# Patient Record
Sex: FEMALE | Race: WHITE | Employment: FULL TIME
[De-identification: names, ages, dates, MRNs, and addresses within clinical notes are randomized per-mention and may not be internally consistent; named-entity substitution may affect disease eponyms.]

---

## 2023-02-17 ENCOUNTER — HOSPITAL ENCOUNTER (OUTPATIENT)
Facility: HOSPITAL | Age: 43
Setting detail: RECURRING SERIES
Discharge: HOME | End: 2023-02-20
Payer: COMMERCIAL

## 2023-02-17 PROCEDURE — 97535 SELF CARE MNGMENT TRAINING: CPT

## 2023-02-17 PROCEDURE — 97110 THERAPEUTIC EXERCISES: CPT

## 2023-02-17 PROCEDURE — 97161 PT EVAL LOW COMPLEX 20 MIN: CPT

## 2023-02-17 NOTE — THERAPY EVALUATION
201 [facility] PHYSICAL THERAPY  [address] [facility], [address], [place], [address] [place] [address] Ph: [phone] Fx: [phone]  Plan of Care / Statement of Necessity for Physical Therapy Services     Patient Name: Savanna Stone : 1980   Medical   Diagnosis: Right shoulder pain [M25.511]  Treatment Diagnosis: Right shoulder pain [M25.511]   Onset Date: DOS 2/15/23     Referral Source: Louise Rasmussen MD [facility]): 2023   Prior Hospitalization: See medical history Provider #: [id number]   Prior Level of Function: Limited use of R shoulder due to pain   Comorbidities: Prior surgery: L shoulder biceps tenodesis 4 years ago (doing great)    Medications: Verified on Patient Summary List     Assessment / key information:  Savanna Stone is a 37 y.o. female who presents to skilled PT for the treatment diagnosis of R shoulder pain secondary to being 2 days s/p R shoulder arthroscopic biceps tenodesis, debridement and SAD. Pt is sleeping okay. Pt has been taking oxycodone intermittently. Icing a couple times. Pt has good transportation system. Pt is R handed. Overall notes that she is doing better for this shoulder than her L shoulder surgery. Pt goals is being able to walk her dogs, gardening,     Pain:   Current: 5/10     Worst: 5/10    Best: 1/10      Objective:    Posture: R forward shoulder in sling    Observation: dressing removed, 4 portals with steri strips, no signs of infection. Pt able to perform wrist AROM with ease  Pt able to perform elbow AAROM without pain full extension and full flexion     Functional Activity:  NT    Shoulder AROM   NT    Shoulder PROM   NT    Shoulder MMT   NT    Today was spent mostly on education for protocol, rehab progression, symptoms management, and providing HEP.  Pt would benefit form skilled PT services addressing the current ROM, strength, functional performance, and balance impairments so that the patient to return to performing all ADLs with minimal restriction/limitation or without any functional limitations.     HEP provided to the patient in order to promote improvement and self management of symptoms and functional performance     Evaluation Complexity:  History:  LOW Complexity : Zero comorbidities / personal factors that will impact the outcome / POC; Examination:  LOW Complexity : 1-2 Standardized tests and measures addressing body structure, function, activity limitation and / or participation in recreation  ;Presentation:  LOW Complexity : Stable, uncomplicated  ;Clinical Decision Making:  MEDIUM Complexity : FOTO score of 26-74 FOTO score = an established functional score where 100 = no disability  Overall Complexity Rating: LOW   Problem List: pain affecting function, decrease ROM, decrease strength, edema affection function, decrease ADL/functional abilities, decrease activity tolerance, decrease flexibility/joint mobility, and decrease transfer abilities    Treatment Plan may include any combination of the followin Therapeutic Exercise, 44889 Neuromuscular Re-Education, 17995 Manual Therapy, 80345 Therapeutic Activity, 35491 Self Care/Home Management, 22432 Electrical Stim unattended, 79272 Electrical Stim attended, and 39670 Vasopneumatic Device  Patient / Family readiness to learn indicated by: asking questions, trying to perform skills, interest, return verbalization , and return demonstration   Persons(s) to be included in education: patient (P)  Barriers to Learning/Limitations: none  Measures taken if barriers to learning present: NA  Patient Goal (s): \"regain function (walking dogs and gardening)  Patient Self Reported Health Status: excellent  Rehabilitation Potential: excellent    Short Term Goals: To be accomplished in 6 weeks  1) Pt will be IND with HEP to facilitate self care management.   2) Pt will improve R shoulder PROM to flexion/ABD >90 deg, Er >45 deg, IR >45 deg to order to improve ability to  perform ADLs below shoulder height within protocol limits     3) Pt will improve worst pain levels from initial evaluation level of 5/10 to 3/10 to show improved QOL and improved overall perception of pain-free/more pain-free function with ADLs. Long Term Goals: To be accomplished in 12 weeks  1) Pt will maintain an average pain of 3/10 or less with all activities showing a progression in overall pain levels despite increases in activity. 2) Pt will improve FOTO scores to 72 in order to show detectable change in overall function. 3) Pt will improve shoulder MMT to >/= 4+/5 in order to be able to perform all ADLs with adequate ability and strength    4) Pt will improve R shoulder AROM to flexion/ABD >160 deg, KAREN >C7 and FIR >L2 to order to improve ability to perform all ADLs without restriction. Frequency / Duration: Patient to be seen 2 times per week for 12 weeks    Patient/ Caregiver education and instruction: Diagnosis, prognosis, self care, activity modification, brace/ splint application, and exercises [x]  Plan of care has been reviewed with PTA    Certification Period: KARELY Marvin, PT       2/17/2023       7:57 AM  ===================================================================  I certify that the above Therapy Services are being furnished while the patient is under my care. I agree with the treatment plan and certify that this therapy is necessary. Physician's Signature:_________________________   DATE:_________   TIME:________                           Jana Dominique MD    ** Signature, Date and Time must be completed for valid certification **  Please sign and fax to [facility] [phone].   Thank you

## 2023-02-21 ENCOUNTER — HOSPITAL ENCOUNTER (OUTPATIENT)
Facility: HOSPITAL | Age: 43
Setting detail: RECURRING SERIES
Discharge: HOME | End: 2023-02-24
Payer: COMMERCIAL

## 2023-02-21 PROCEDURE — 97110 THERAPEUTIC EXERCISES: CPT

## 2023-02-21 PROCEDURE — 97140 MANUAL THERAPY 1/> REGIONS: CPT

## 2023-02-21 NOTE — PROGRESS NOTES
PHYSICAL / OCCUPATIONAL THERAPY - DAILY TREATMENT NOTE (updated )    Patient Name: Sajan Mirza    Date: 2023    : 1980  Insurance: Payor: Mary Barakat / Plan: Mary Barakat / Product Type: *No Product type* /      Patient  verified Yes     Visit #   Current / Total 2 12   Time   In / Out 1135 1230   Pain   In / Out 2/10 1/10   Subjective Functional Status/Changes: The exercises are going well. So far no setback. Changes to:  Meds, Allergies, Med Hx, Sx Hx? If yes, update Summary List no       TREATMENT AREA =  Right shoulder pain [M25.511]    OBJECTIVE    Modalities Rationale:     decrease edema, decrease inflammation, and decrease pain to improve patient's ability to progress to PLOF and address remaining functional goals. min [] Estim Unattended, type/location:                                      []  w/ice    []  w/heat    min [] Estim Attended, type/location:                                     []  w/US     []  w/ice    []  w/heat    []  TENS insruct      min []  Mechanical Traction: type/lbs                   []  pro   []  sup   []  int   []  cont    []  before manual    []  after manual    min []  Ultrasound, settings/location:      min []  Iontophoresis w/ dexamethasone, location:                                               []  take home patch       []  in clinic   15 min  unbill [x]  Ice     []  Heat    location/position: Seated R shoulder    min []  Paraffin,  details:     min []  Vasopneumatic Device, press/temp:     min []  Catrina Chaparro / Zina Mailman: If using vaso (only need to measure limb vaso being performed on)      pre-treatment girth :       post-treatment girth :       measured at (landmark location) :      min []  Other:    Skin assessment post-treatment (if applicable):    [x]  intact    [x]  redness- no adverse reaction                 []redness - adverse reaction:         Therapeutic Procedures:   Tx Min Billable or 1:1 Min (if diff from [employer]) Procedure, Rationale, Specifics   25 [id number] Therapeutic Exercise (timed):  increase ROM, strength, coordination, balance, and proprioception to improve patient's ability to progress to PLOF and address remaining functional goals. (see flow sheet as applicable)     Details if applicable:  R shoulder PROM per protocol      15  70552 Manual Therapy (timed):  decrease pain, increase ROM, and increase tissue extensibility to improve patient's ability to progress to PLOF and address remaining functional goals. The manual therapy interventions were performed at a separate and distinct time from the therapeutic activities interventions . (see flow sheet as applicable)     Details if applicable:  gentle oscillation of the shoulder for relaxation  GHJ mobs AP/PA/onf gr 1-2    Gentle STM along shoulder          Details if applicable:            Details if applicable:            Details if applicable:     36  MC BC Totals Reminder: bill using total billable min of TIMED therapeutic procedures (example: do not include dry needle or estim unattended, both untimed codes, in totals to left)  8-22 min = 1 unit; 23-37 min = 2 units; 38-52 min = 3 units; 53-67 min = 4 units; 68-82 min = 5 units   Total Total     [x]  Patient Education billed concurrently with other procedures   [x] Review HEP    [x] Progressed/Changed HEP, detail: Access Code: [email or web address]  URL: [email or web address]  Date: 02/21/2023  Prepared by: Damian Bassett    Exercises  Flexion-Extension Shoulder Pendulum with Table Support - 1-4 x daily - 7 x weekly - 20\" hold  Circular Shoulder Pendulum with Table Support - 1-4 x daily - 7 x weekly - 20\" hold  Elbow Flexion PROM - 1-4 x daily - 7 x weekly - 10 reps  Wrist Circumduction AROM - 1-4 x daily - 7 x weekly - 10 reps  Forearm Strengthening with Ball Squeeze - 1-8 x daily - 7 x weekly - 10 reps - 3\" hold  Ice - 1-4 x daily - 7 x weekly - 10-15' hold  Standing 'L' Stretch at Counter - 1-2 x daily - 7 x weekly - 10 reps - 5\" hold  Seated Scapular Retraction - 1-4 x daily - 7 x weekly - 10 reps - 3\" hold    [] Other detail:       Objective Information/Functional Measures/Assessment    No active supination of elbow flexion 6 wks  Progress to AAROM, AROM at 6 wks   Sling 6 weeks     Patient tolerated today's treatment very well. Showed good PROM today: flexion about 100 de, ABD 90 deg, IR 45 deg, ER 30 deg. No pain just tension with most of these. Added scap squeezes and shoulder walkaway flexion stretch today to HEP    Patient will continue to benefit from skilled PT / OT services to modify and progress therapeutic interventions, analyze and address functional mobility deficits, analyze and address ROM deficits, analyze and address strength deficits, analyze and address soft tissue restrictions, analyze and cue for proper movement patterns, analyze and modify for postural abnormalities, and instruct in home and community integration to address functional deficits and attain remaining goals. Progress toward goals / Updated goals:  []  See Progress Note/Recertification    Short Term Goals: To be accomplished in 6 weeks  1) Pt will be IND with HEP to facilitate self care management. 2) Pt will improve R shoulder PROM to flexion/ABD >90 deg, Er >45 deg, IR >45 deg to order to improve ability to perform ADLs below shoulder height within protocol limits     3) Pt will improve worst pain levels from initial evaluation level of 5/10 to 3/10 to show improved QOL and improved overall perception of pain-free/more pain-free function with ADLs. Long Term Goals: To be accomplished in 12 weeks  1) Pt will maintain an average pain of 3/10 or less with all activities showing a progression in overall pain levels despite increases in activity. 2) Pt will improve FOTO scores to 72 in order to show detectable change in overall function.     3) Pt will improve shoulder MMT to >/= 4+/5 in order to be able to perform all ADLs with adequate ability and strength    4) Pt will improve R shoulder AROM to flexion/ABD >160 deg, KAREN >C7 and FIR >L2 to order to improve ability to perform all ADLs without restriction. All goals remain applicable at this time.      PLAN  Yes  Continue plan of care  [x]  Upgrade activities as tolerated  []  Discharge due to :  []  Other:    Brendan Huerta PT    2/21/2023    8:43 AM    Future Appointments   Date Time Provider Katelyn Perera   2/21/2023 11:30 AM Brendan Huerta PT [facility]   2/23/2023 11:30 AM Brendan Huerta PT [facility]

## 2023-02-23 ENCOUNTER — HOSPITAL ENCOUNTER (OUTPATIENT)
Facility: HOSPITAL | Age: 43
Setting detail: RECURRING SERIES
Discharge: HOME | End: 2023-02-26
Payer: COMMERCIAL

## 2023-02-23 PROCEDURE — 97140 MANUAL THERAPY 1/> REGIONS: CPT

## 2023-02-23 PROCEDURE — 97110 THERAPEUTIC EXERCISES: CPT

## 2023-02-23 NOTE — PROGRESS NOTES
PHYSICAL / OCCUPATIONAL THERAPY - DAILY TREATMENT NOTE (updated )    Patient Name: Juliette Rao    Date: 2023    : 1980  Insurance: Payor: Sara Grimaldo / Plan: Sara Grimaldo / Product Type: *No Product type* /      Patient  verified Yes     Visit #   Current / Total 3 12   Time   In / Out 1135 1220   Pain   In / Out 2/10 1/10   Subjective Functional Status/Changes: Tried to get a robe on and was more sore. Better today. Changes to:  Meds, Allergies, Med Hx, Sx Hx? If yes, update Summary List no       TREATMENT AREA =  Right shoulder pain [M25.511]    OBJECTIVE    Modalities Rationale:     decrease edema, decrease inflammation, and decrease pain to improve patient's ability to progress to PLOF and address remaining functional goals. min [] Estim Unattended, type/location:                                      []  w/ice    []  w/heat    min [] Estim Attended, type/location:                                     []  w/US     []  w/ice    []  w/heat    []  TENS insruct      min []  Mechanical Traction: type/lbs                   []  pro   []  sup   []  int   []  cont    []  before manual    []  after manual    min []  Ultrasound, settings/location:      min []  Iontophoresis w/ dexamethasone, location:                                               []  take home patch       []  in clinic   10 min  unbill [x]  Ice     []  Heat    location/position: Seated R shoulder    min []  Paraffin,  details:     min []  Vasopneumatic Device, press/temp:     min []  Mal Bake / Annice Reason: If using vaso (only need to measure limb vaso being performed on)      pre-treatment girth :       post-treatment girth :       measured at (landmark location) :      min []  Other:    Skin assessment post-treatment (if applicable):    [x]  intact    [x]  redness- no adverse reaction                 []redness - adverse reaction:         Therapeutic Procedures:   Tx Min Billable or 1:1 Min (if diff from [employer]) Procedure, Rationale, Specifics   25  42048 Therapeutic Exercise (timed):  increase ROM, strength, coordination, balance, and proprioception to improve patient's ability to progress to PLOF and address remaining functional goals. (see flow sheet as applicable)     Details if applicable:  R shoulder PROM per protocol        10  64292 Manual Therapy (timed):  decrease pain, increase ROM, and increase tissue extensibility to improve patient's ability to progress to PLOF and address remaining functional goals. The manual therapy interventions were performed at a separate and distinct time from the therapeutic activities interventions . (see flow sheet as applicable)     Details if applicable:  gentle oscillation of the shoulder for relaxation  GHJ mobs AP/PA/onf gr 1-2    Gentle STM along shoulder          Details if applicable:            Details if applicable:            Details if applicable:     28   BC Totals Reminder: bill using total billable min of TIMED therapeutic procedures (example: do not include dry needle or estim unattended, both untimed codes, in totals to left)  8-22 min = 1 unit; 23-37 min = 2 units; 38-52 min = 3 units; 53-67 min = 4 units; 68-82 min = 5 units   Total Total     [x]  Patient Education billed concurrently with other procedures   [x] Review HEP    [] Progressed/Changed HEP, detail: Access Code: [email or web address]  URL: [email or web address]  Date: 02/21/2023  Prepared by: Bonny Warren    Exercises  Flexion-Extension Shoulder Pendulum with Table Support - 1-4 x daily - 7 x weekly - 20\" hold  Circular Shoulder Pendulum with Table Support - 1-4 x daily - 7 x weekly - 20\" hold  Elbow Flexion PROM - 1-4 x daily - 7 x weekly - 10 reps  Wrist Circumduction AROM - 1-4 x daily - 7 x weekly - 10 reps  Forearm Strengthening with Ball Squeeze - 1-8 x daily - 7 x weekly - 10 reps - 3\" hold  Ice - 1-4 x daily - 7 x weekly - 10-15' hold  Standing 'L' Stretch at Counter - 1-2 x daily - 7 x weekly - 10 reps - 5\" hold  Seated Scapular Retraction - 1-4 x daily - 7 x weekly - 10 reps - 3\" hold    [] Other detail:       Objective Information/Functional Measures/Assessment    No active supination of elbow flexion 6 wks  Progress to AAROM, AROM at 6 wks   Sling 6 weeks     Patient tolerated today's treatment very well. Good improvement in available shoulder PROM compared to last visit. Pain very well managed    Patient will continue to benefit from skilled PT / OT services to modify and progress therapeutic interventions, analyze and address functional mobility deficits, analyze and address ROM deficits, analyze and address strength deficits, analyze and address soft tissue restrictions, analyze and cue for proper movement patterns, analyze and modify for postural abnormalities, and instruct in home and community integration to address functional deficits and attain remaining goals. Progress toward goals / Updated goals:  []  See Progress Note/Recertification    Short Term Goals: To be accomplished in 6 weeks  1) Pt will be IND with HEP to facilitate self care management. 2) Pt will improve R shoulder PROM to flexion/ABD >90 deg, Er >45 deg, IR >45 deg to order to improve ability to perform ADLs below shoulder height within protocol limits  Progressing 2/23/23  3) Pt will improve worst pain levels from initial evaluation level of 5/10 to 3/10 to show improved QOL and improved overall perception of pain-free/more pain-free function with ADLs. Progressing 2/23/23     Long Term Goals: To be accomplished in 12 weeks  1) Pt will maintain an average pain of 3/10 or less with all activities showing a progression in overall pain levels despite increases in activity. 2) Pt will improve FOTO scores to 72 in order to show detectable change in overall function.     3) Pt will improve shoulder MMT to >/= 4+/5 in order to be able to perform all ADLs with adequate ability and strength    4) Pt will improve R shoulder AROM to flexion/ABD >160 deg, KAREN >C7 and FIR >L2 to order to improve ability to perform all ADLs without restriction. All goals remain applicable at this time.      PLAN  Yes  Continue plan of care  [x]  Upgrade activities as tolerated  []  Discharge due to :  []  Other:    [place] PT    2/23/2023    12:43 PM    Future Appointments   Date Time Provider Katelyn Perera   2/28/2023  6:00 PM [place] PT [facility] [place] [facility]   3/2/2023 11:30 AM [place] PT [facility]

## 2023-02-28 ENCOUNTER — HOSPITAL ENCOUNTER (OUTPATIENT)
Facility: HOSPITAL | Age: 43
Setting detail: RECURRING SERIES
Discharge: HOME | End: 2023-03-03
Payer: COMMERCIAL

## 2023-02-28 PROCEDURE — 97140 MANUAL THERAPY 1/> REGIONS: CPT

## 2023-02-28 PROCEDURE — 97110 THERAPEUTIC EXERCISES: CPT

## 2023-02-28 NOTE — PROGRESS NOTES
PHYSICAL / OCCUPATIONAL THERAPY - DAILY TREATMENT NOTE (updated )    Patient Name: Fitz Campos    Date: 2023    : 1980  Insurance: Payor: Tristen Zapata / Plan: Tristen Zapata / Product Type: *No Product type* /      Patient  verified Yes     Visit #   Current / Total 4 12   Time   In / Out 600 645   Pain   In / Out 2/10 1/10   Subjective Functional Status/Changes: More sore today but overall good. Changes to:  Meds, Allergies, Med Hx, Sx Hx? If yes, update Summary List no       TREATMENT AREA =  Right shoulder pain [M25.511]    OBJECTIVE    Modalities Rationale:     decrease edema, decrease inflammation, and decrease pain to improve patient's ability to progress to PLOF and address remaining functional goals. min [] Estim Unattended, type/location:                                      []  w/ice    []  w/heat    min [] Estim Attended, type/location:                                     []  w/US     []  w/ice    []  w/heat    []  TENS insruct      min []  Mechanical Traction: type/lbs                   []  pro   []  sup   []  int   []  cont    []  before manual    []  after manual    min []  Ultrasound, settings/location:      min []  Iontophoresis w/ dexamethasone, location:                                               []  take home patch       []  in clinic   10 min  unbill [x]  Ice     []  Heat    location/position: Seated R shoulder    min []  Paraffin,  details:     min []  Vasopneumatic Device, press/temp:     min []  Paul Sniff / Katerina Chico: If using vaso (only need to measure limb vaso being performed on)      pre-treatment girth :       post-treatment girth :       measured at (landmark location) :      min []  Other:    Skin assessment post-treatment (if applicable):    [x]  intact    [x]  redness- no adverse reaction                 []redness - adverse reaction:         Therapeutic Procedures:   Tx Min Billable or 1:1 Min (if diff from Tx Min) Procedure, Rationale, Specifics   25  87580 Therapeutic Exercise (timed):  increase ROM, strength, coordination, balance, and proprioception to improve patient's ability to progress to PLOF and address remaining functional goals. (see flow sheet as applicable)     Details if applicable:  R shoulder PROM per protocol  Updated HEP        10  62663 Manual Therapy (timed):  decrease pain, increase ROM, and increase tissue extensibility to improve patient's ability to progress to PLOF and address remaining functional goals. The manual therapy interventions were performed at a separate and distinct time from the therapeutic activities interventions . (see flow sheet as applicable)     Details if applicable:  gentle oscillation of the shoulder for relaxation  GHJ mobs AP/PA/onf gr 1-2    Gentle STM along shoulder          Details if applicable:            Details if applicable:            Details if applicable:     28  [facility] Totals Reminder: bill using total billable min of TIMED therapeutic procedures (example: do not include dry needle or estim unattended, both untimed codes, in totals to left)  8-22 min = 1 unit; 23-37 min = 2 units; 38-52 min = 3 units; 53-67 min = 4 units; 68-82 min = 5 units   Total Total     [x]  Patient Education billed concurrently with other procedures   [x] Review HEP    [x] Progressed/Changed HEP, detail: added table slides flexion and ABD and cane ER    [] Other detail:       Objective Information/Functional Measures/Assessment    No active supination of elbow flexion 6 wks  Progress to AAROM, AROM at 6 wks   Sling 6 weeks     Doing well with her ROM this far out from surgery about 2 weeks. Updated HEP today for more IND stretching.     Patient will continue to benefit from skilled PT / OT services to modify and progress therapeutic interventions, analyze and address functional mobility deficits, analyze and address ROM deficits, analyze and address strength deficits, analyze and address soft tissue restrictions, analyze and cue for proper movement patterns, analyze and modify for postural abnormalities, and instruct in home and community integration to address functional deficits and attain remaining goals. Progress toward goals / Updated goals:  []  See Progress Note/Recertification    Short Term Goals: To be accomplished in 6 weeks  1) Pt will be IND with HEP to facilitate self care management. Progressing 2/28/23  2) Pt will improve R shoulder PROM to flexion/ABD >90 deg, Er >45 deg, IR >45 deg to order to improve ability to perform ADLs below shoulder height within protocol limits  Progressing 2/23/23  3) Pt will improve worst pain levels from initial evaluation level of 5/10 to 3/10 to show improved QOL and improved overall perception of pain-free/more pain-free function with ADLs. Progressing 2/23/23     Long Term Goals: To be accomplished in 12 weeks  1) Pt will maintain an average pain of 3/10 or less with all activities showing a progression in overall pain levels despite increases in activity. 2) Pt will improve FOTO scores to 72 in order to show detectable change in overall function. 3) Pt will improve shoulder MMT to >/= 4+/5 in order to be able to perform all ADLs with adequate ability and strength    4) Pt will improve R shoulder AROM to flexion/ABD >160 deg, KAREN >C7 and FIR >L2 to order to improve ability to perform all ADLs without restriction. All goals remain applicable at this time.      PLAN  Yes  Continue plan of care  [x]  Upgrade activities as tolerated  []  Discharge due to :  []  Other:    Bonny Warren PT    2/28/2023    1:49 PM    Future Appointments   Date Time Provider Katelyn Perera   2/28/2023  6:00 PM Bonny Warren PT [facility]   3/2/2023 11:30 AM Bonny Warren PT [facility]   3/8/2023 11:00 AM Lorenzo Anderson PTA [facility]   3/10/2023 11:30 AM Bonny Warren PT [facility]   3/14/2023 12:00 PM Bonny Warren PT [facility]   3/17/2023  2:30 PM Jayshree Corrigan [facility]   3/21/2023 11:30 AM Romeo Salcedo PT [facility]   3/24/2023 11:30 AM Romeo Salcedo PT [facility]   3/31/2023 11:30 AM Romeo Salcedo PT [facility]

## 2023-03-02 ENCOUNTER — HOSPITAL ENCOUNTER (OUTPATIENT)
Facility: HOSPITAL | Age: 43
Setting detail: RECURRING SERIES
Discharge: HOME | End: 2023-03-05
Payer: COMMERCIAL

## 2023-03-02 PROCEDURE — 97140 MANUAL THERAPY 1/> REGIONS: CPT

## 2023-03-02 PROCEDURE — 97110 THERAPEUTIC EXERCISES: CPT

## 2023-03-02 NOTE — THERAPY RECERTIFICATION
[facility] PHYSICAL THERAPY  [address] [facility], [address], [place], [address] [place] [address] Ph: [phone] Fx: [phone]  PHYSICAL THERAPY PROGRESS NOTE  Patient Name: Cathryn Huerta : 1980   Treatment/Medical Diagnosis: Right shoulder pain [M25.511]   Referral Source: Miki Hill MD     Date of Initial Visit: 23 Attended Visits: 5 Missed Visits: 0     SUMMARY OF TREATMENT  Pt has been evaluated/assessed and participated in skilled therapeutic exercise, functional activity training,?neuromuscular facilitation and coordination training, patient education,?manual therapy interventions including joint mobs gr 1-3, gentle PROM and stretching, modalities including ice?and instruction on HEP in order to improve upon R shoulder ROM, strength, decreased pain, and return to PLOF as per protocol    SUBJECTIVE: Overall she is doing well. She is still  sore in the arm at times. Sees the surgeon later today    CURRENT STATUS  Pt is s/p R shoulder arthroscopic biceps tenodesis, debridement and SAD performed on 2/15/23. Pt has made good progress in PT as demonstrated by decreased max pain levels at 2-3/10 and average pain level of 1-2/10. Pt is about 2 weeks post op and has been compliant with sling use and following the protocol. Not lifting anything with R arm heavier than a cell phone. Pt has been icing and gets out of the sling intermittently throughout the day while resting, doing HEP, and showering and dressing. Pt notes having been performing the HEP as prescribed. Pt will continue to benefit from skilled PT to progress exercises and improve upon?functional activities. OBJECTIVE:   R shoulder PROm   Flexion 140 deg    deg   Er 45 deg   IR about 50 deg    Pt will be IND with HEP to facilitate self care management. Status at last Eval: goal established   Current Status: progressing with HEP  Goal Met?   progressing    2.   Pt will improve R shoulder PROM to flexion/ABD >90 deg, Er >45 deg, IR >45 deg to order to improve ability to perform ADLs below shoulder height within protocol limits     Status at last Eval: R shoulder PROM NT  Current Status: R shoulder PROm   Flexion 140 deg    deg   Er 45 deg   IR about 50 deg  Goal Met?  yes    3. Pt will improve worst pain levels from initial evaluation level of 5/10 to 3/10 to show improved QOL and improved overall perception of pain-free/more pain-free function with ADLs. Status at last Eval: worst pain 5/10  Current Status: wort pain 2-3/10  Goal Met?  yes      New Goals to be achieved in __8__ weeks  1. Pt will be IND with HEP to facilitate self care management. 2. Pt will improve R shoulder PROM to flexion/ABD >140 deg, Er >60 deg, IR >60 deg to order to improve ability to perform ADLs below shoulder height within protocol limits     3. Pt will improve FOTO scores to 72 in order to show detectable change in overall function. RECOMMENDATIONS  Continue with therapy biceps tendonesis protocol 2x/week for 8 weeks to further improve upon R shoulder ROM, strength, stability, and functional activities before transitioning to DC with HEP. Please advise. Thank you. If you have any questions/comments please contact us directly at [phone]. Thank you for allowing us to assist in the care of your patient.     Tomer Núñez, PT       3/2/2023       12:52 PM

## 2023-03-02 NOTE — PROGRESS NOTES
PHYSICAL / OCCUPATIONAL THERAPY - DAILY TREATMENT NOTE (updated )    Patient Name: Chico Penning    Date: 3/2/2023    : 1980  Insurance: Payor: Lora Davis / Plan: Lora Davis / Product Type: *No Product type* /      Patient  verified Yes     Visit #   Current / Total 5 12   Time   In / Out 1130 1145   Pain   In / Out 1/10 1/10   Subjective Functional Status/Changes: Overall she is doing well. She is still  sore in the arm at times. Sees the surgeon later today   Changes to:  Meds, Allergies, Med Hx, Sx Hx? If yes, update Summary List no       TREATMENT AREA =  Right shoulder pain [M25.511]    OBJECTIVE    Modalities Rationale:     decrease edema, decrease inflammation, and decrease pain to improve patient's ability to progress to PLOF and address remaining functional goals. min [] Estim Unattended, type/location:                                      []  w/ice    []  w/heat    min [] Estim Attended, type/location:                                     []  w/US     []  w/ice    []  w/heat    []  TENS insruct      min []  Mechanical Traction: type/lbs                   []  pro   []  sup   []  int   []  cont    []  before manual    []  after manual    min []  Ultrasound, settings/location:      min []  Iontophoresis w/ dexamethasone, location:                                               []  take home patch       []  in clinic   10 min  unbill [x]  Ice     []  Heat    location/position: Seated R shoulder    min []  Paraffin,  details:     min []  Vasopneumatic Device, press/temp:     min []  Norbert Tone / [employer]: If using vaso (only need to measure limb vaso being performed on)      pre-treatment girth :       post-treatment girth :       measured at (landmark location) :      min []  Other:    Skin assessment post-treatment (if applicable):    [x]  intact    [x]  redness- no adverse reaction                 []redness - adverse reaction:         Therapeutic Procedures:   Tx Min Billable or 1:1 Min (if diff from Tx Min) Procedure, Rationale, Specifics   15  42599 Therapeutic Exercise (timed):  increase ROM, strength, coordination, balance, and proprioception to improve patient's ability to progress to PLOF and address remaining functional goals. (see flow sheet as applicable)     Details if applicable:  R shoulder PROM per protocol         10  11189 Manual Therapy (timed):  decrease pain, increase ROM, and increase tissue extensibility to improve patient's ability to progress to PLOF and address remaining functional goals. The manual therapy interventions were performed at a separate and distinct time from the therapeutic activities interventions . (see flow sheet as applicable)     Details if applicable:  gentle oscillation of the shoulder for relaxation  GHJ mobs AP/PA/onf gr 1-2    Gentle STM along shoulder   5  48727 Self Care/Home Management (timed):  improve patient knowledge and understanding of pain reducing techniques, positioning, posture/ergonomics, home safety, activity modification, diagnosis/prognosis, and physical therapy expectations, procedures and progression  to improve patient's ability to progress to PLOF and address remaining functional goals.   (see flow sheet as applicable)      Details if applicable:  brief reassessment           Details if applicable:            Details if applicable:     27  [facility] Totals Reminder: bill using total billable min of TIMED therapeutic procedures (example: do not include dry needle or estim unattended, both untimed codes, in totals to left)  8-22 min = 1 unit; 23-37 min = 2 units; 38-52 min = 3 units; 53-67 min = 4 units; 68-82 min = 5 units   Total Total     [x]  Patient Education billed concurrently with other procedures   [x] Review HEP    [] Progressed/Changed HEP, detail:     [] Other detail:       Objective Information/Functional Measures/Assessment    No active supination of elbow flexion 6 wks  Progress to AAROM, AROM at 6 wks   Sling 6 weeks     See PN    ROM for Er able to get up to 45 deg today. Reports minimal pain and good mobility with table flexion stretch    Patient will continue to benefit from skilled PT / OT services to modify and progress therapeutic interventions, analyze and address functional mobility deficits, analyze and address ROM deficits, analyze and address strength deficits, analyze and address soft tissue restrictions, analyze and cue for proper movement patterns, analyze and modify for postural abnormalities, and instruct in home and community integration to address functional deficits and attain remaining goals. Progress toward goals / Updated goals:  [x]  See Progress Note/Recertification    New Goals to be achieved in __8__ weeks  1. Pt will be IND with HEP to facilitate self care management. 2. Pt will improve R shoulder PROM to flexion/ABD >140 deg, Er >60 deg, IR >60 deg to order to improve ability to perform ADLs below shoulder height within protocol limits     3. Pt will improve FOTO scores to 72 in order to show detectable change in overall function. All goals remain applicable at this time.      PLAN  Yes  Continue plan of care  [x]  Upgrade activities as tolerated  []  Discharge due to :  []  Other:    Clint Pitts, PT    3/2/2023    10:01 AM    Future Appointments   Date Time Provider Katelyn Perera   3/2/2023 11:30 AM Clint Pitts, PT [facility]   3/8/2023 11:00 AM Verita Lombard, PTA [facility]   3/10/2023 11:30 AM Clint Pitts, PT [facility]   3/14/2023 12:00 PM Clint Pitts PT [facility]   3/17/2023  2:30 PM Clint Pitts, PT [facility]   3/21/2023 11:30 AM Clint Pitts, PT [facility]   3/24/2023 11:30 AM Clint Pitts, PT [facility]   3/31/2023 11:30 AM Clint Pitts PT [facility]

## 2023-03-08 ENCOUNTER — HOSPITAL ENCOUNTER (OUTPATIENT)
Facility: HOSPITAL | Age: 43
Setting detail: RECURRING SERIES
Discharge: HOME | End: 2023-03-11
Payer: COMMERCIAL

## 2023-03-08 PROCEDURE — 97140 MANUAL THERAPY 1/> REGIONS: CPT

## 2023-03-08 PROCEDURE — 97110 THERAPEUTIC EXERCISES: CPT

## 2023-03-08 NOTE — PROGRESS NOTES
PHYSICAL / OCCUPATIONAL THERAPY - DAILY TREATMENT NOTE (updated )    Patient Name: Jeramy Ayon    Date: 3/8/2023    : 1980  Insurance: Payor: Justina Alejandro / Plan: Justina Alejandro / Product Type: *No Product type* /      Patient  verified Yes     Visit #   Current / Total 6 12   Time   In / Out 11:07  11: 42    Pain   In / Out .5  0   Subjective Functional Status/Changes: Pt reports she was tight and sore. Drove 6 hours yesterday. She went to tighten the gas cap and felt pain but she didn't feel like she did anything . Taking brace off quite a bit at home. Changes to:  Meds, Allergies, Med Hx, Sx Hx? If yes, update Summary List no       TREATMENT AREA =  Right shoulder pain [M25.511]    OBJECTIVE    Modalities Rationale:     decrease edema, decrease inflammation, decrease pain, and increase tissue extensibility to improve patient's ability to progress to PLOF and address remaining functional goals. min [] Estim Unattended, type/location:                                      []  w/ice    []  w/heat    min [] Estim Attended, type/location:                                     []  w/US     []  w/ice    []  w/heat    []  TENS insruct      min []  Mechanical Traction: type/lbs                   []  pro   []  sup   []  int   []  cont    []  before manual    []  after manual    min []  Ultrasound, settings/location:      min []  Iontophoresis w/ dexamethasone, location:                                               []  take home patch       []  in clinic   10 min  unbill [x]  Ice     []  Heat    location/position: Sit  right shoulder    min []  Paraffin,  details:     min []  Vasopneumatic Device, press/temp:     min []  Uma Blend / Sebastián Chang:     If using vaso (only need to measure limb vaso being performed on)      pre-treatment girth :       post-treatment girth :       measured at (landmark location) :      min []  Other:    Skin assessment post-treatment (if applicable):    [x]  intact    []  redness- no adverse reaction                 []redness - adverse reaction:         Therapeutic Procedures: Tx Min Billable or 1:1 Min (if diff from Tx Min) Procedure, Rationale, Specifics   15  42827 Therapeutic Exercise (timed):  increase ROM, strength, coordination, balance, and proprioception to improve patient's ability to progress to PLOF and address remaining functional goals. (see flow sheet as applicable)     Details if applicable:       10  02858 Manual Therapy (timed):  decrease pain, increase ROM, increase tissue extensibility, and decrease edema to improve patient's ability to progress to PLOF and address remaining functional goals. The manual therapy interventions were performed at a separate and distinct time from the therapeutic activities interventions .  (see flow sheet as applicable)     Details if applicable:  supine gentle PROM R shoulder           Details if applicable:            Details if applicable:            Details if applicable:     22  [facility] Totals Reminder: bill using total billable min of TIMED therapeutic procedures (example: do not include dry needle or estim unattended, both untimed codes, in totals to left)  8-22 min = 1 unit; 23-37 min = 2 units; 38-52 min = 3 units; 53-67 min = 4 units; 68-82 min = 5 units   Total Total     [x]  Patient Education billed concurrently with other procedures   [x] Review HEP    [] Progressed/Changed HEP, detail:    [] Other detail:       Objective Information/Functional Measures/Assessment    PROM flexion: grossly 130 deg  Self AAROM ER: ~ 25 deg    Patient will continue to benefit from skilled PT / OT services to modify and progress therapeutic interventions, analyze and address functional mobility deficits, analyze and address ROM deficits, analyze and address strength deficits, analyze and address soft tissue restrictions, analyze and cue for proper movement patterns, and instruct in home and community integration to address functional deficits and attain remaining goals. Progress toward goals / Updated goals:  []  See Progress Note/Recertification    Review precautions per MD protocol . Mild tightness with ER .  Tissue mobility improving after manual stretching    PLAN  Yes  Continue plan of care  [x]  Upgrade activities as tolerated  []  Discharge due to :  []  Other:    Beverly Patino PTA    3/8/2023    11:08 AM    Future Appointments   Date Time Provider Katelyn Perera   3/10/2023 11:30 AM Andre Garcia, PT [facility]   3/14/2023 12:00 PM Andre Garcia, PT [facility]   3/17/2023  2:30 PM Andre Garica, PT [facility]   3/21/2023 11:30 AM Andre Garcia, PT [facility]   3/24/2023 11:30 AM Andre Garcia, PT [facility]   3/31/2023 11:30 AM Andre Garcia, PT [facility]

## 2023-03-10 ENCOUNTER — HOSPITAL ENCOUNTER (OUTPATIENT)
Facility: HOSPITAL | Age: 43
Setting detail: RECURRING SERIES
Discharge: HOME | End: 2023-03-13
Payer: COMMERCIAL

## 2023-03-10 PROCEDURE — 97140 MANUAL THERAPY 1/> REGIONS: CPT

## 2023-03-10 PROCEDURE — 97535 SELF CARE MNGMENT TRAINING: CPT

## 2023-03-10 NOTE — PROGRESS NOTES
PHYSICAL / OCCUPATIONAL THERAPY - DAILY TREATMENT NOTE (updated )    Patient Name: Nay Haley    Date: 3/10/2023    : 1980  Insurance: Payor: /      Patient  verified Yes     Visit #   Current / Total 7 12   Time   In / Out 1135 1225   Pain   In / Out 1/10 0/10   Subjective Functional Status/Changes: Much less pain than when she drove and twisted the gas cap. Just very tight   Changes to:  Meds, Allergies, Med Hx, Sx Hx? If yes, update Summary List no       TREATMENT AREA =  Right shoulder pain [M25.511]    OBJECTIVE    Modalities Rationale:     decrease edema, decrease inflammation, decrease pain, and increase tissue extensibility to improve patient's ability to progress to PLOF and address remaining functional goals. min [] Estim Unattended, type/location:                                      []  w/ice    []  w/heat    min [] Estim Attended, type/location:                                     []  w/US     []  w/ice    []  w/heat    []  TENS insruct      min []  Mechanical Traction: type/lbs                   []  pro   []  sup   []  int   []  cont    []  before manual    []  after manual    min []  Ultrasound, settings/location:      min []  Iontophoresis w/ dexamethasone, location:                                               []  take home patch       []  in clinic   10 min  unbill [x]  Ice     []  Heat    location/position: Sit  right shoulder    min []  Paraffin,  details:     min []  Vasopneumatic Device, press/temp:     min []  Grace Longest / Izora Alessandro: If using vaso (only need to measure limb vaso being performed on)      pre-treatment girth :       post-treatment girth :       measured at (landmark location) :      min []  Other:    Skin assessment post-treatment (if applicable):    [x]  intact    []  redness- no adverse reaction                 []redness - adverse reaction:         Therapeutic Procedures:     Tx Min Billable or 1:1 Min (if diff from [employer]) Procedure, Rationale, Specifics   10  [id number] Self Care/Home Management (timed):  improve patient knowledge and understanding of activity modification, diagnosis/prognosis, physical therapy expectations, procedures and progression, and review of protocol  to improve patient's ability to progress to PLOF and address remaining functional goals. (see flow sheet as applicable)        Details if applicable:       30  32641 Manual Therapy (timed):  decrease pain, increase ROM, increase tissue extensibility, and decrease edema to improve patient's ability to progress to PLOF and address remaining functional goals. The manual therapy interventions were performed at a separate and distinct time from the therapeutic activities interventions .  (see flow sheet as applicable)     Details if applicable:  supine gentle PROM R shoulder   GHJ mobs AP, inf gr 2-3   STM of pec major, lat, subscap          Details if applicable:            Details if applicable:            Details if applicable:     36  MC BC Totals Reminder: bill using total billable min of TIMED therapeutic procedures (example: do not include dry needle or estim unattended, both untimed codes, in totals to left)  8-22 min = 1 unit; 23-37 min = 2 units; 38-52 min = 3 units; 53-67 min = 4 units; 68-82 min = 5 units   Total Total     [x]  Patient Education billed concurrently with other procedures   [x] Review HEP    [] Progressed/Changed HEP, detail:    [] Other detail:       Objective Information/Functional Measures/Assessment    R shoulder PROM scaption 140 deg, IR 50 deg, ER 25 improved to 45 deg   Reviewed protocol to avoid supination against resistance      Patient will continue to benefit from skilled PT / OT services to modify and progress therapeutic interventions, analyze and address functional mobility deficits, analyze and address ROM deficits, analyze and address strength deficits, analyze and address soft tissue restrictions, analyze and cue for proper movement patterns, and instruct in home and community integration to address functional deficits and attain remaining goals.    Progress toward goals / Updated goals:  []  See Progress Note/Recertification    New Goals to be achieved in __8__ weeks  1. Pt will be IND with HEP to facilitate self care management.  2. Pt will improve R shoulder PROM to flexion/ABD >140 deg, Er >60 deg, IR >60 deg to order to improve ability to perform ADLs below shoulder height within protocol limits   Progressing 3/10/23 PROM scaption 140 deg, IR 50 deg, ER 25 improved to 45 deg   3. Pt will improve FOTO scores to 72 in order to show detectable change in overall function.     PLAN  Yes  Continue plan of care  [x]  Upgrade activities as tolerated  []  Discharge due to :  []  Other:    Nas Glez PT    3/10/2023    8:02 AM    Future Appointments   Date Time Provider Department Center   3/10/2023 11:30 AM Nas Glez PT [facility]   3/14/2023 12:00 PM Nas Glez PT [facility]   3/17/2023  2:30 PM Nas Glez PT [facility]   3/21/2023 11:30 AM Nas Glez PT [facility]   3/24/2023 11:30 AM Nas Glez PT [facility]   3/31/2023 11:30 AM Nas Glez PT [facility]

## 2023-03-14 ENCOUNTER — HOSPITAL ENCOUNTER (OUTPATIENT)
Facility: HOSPITAL | Age: 43
Setting detail: RECURRING SERIES
Discharge: HOME | End: 2023-03-17
Payer: COMMERCIAL

## 2023-03-14 PROCEDURE — 97110 THERAPEUTIC EXERCISES: CPT

## 2023-03-14 PROCEDURE — 97140 MANUAL THERAPY 1/> REGIONS: CPT

## 2023-03-14 NOTE — PROGRESS NOTES
PHYSICAL / OCCUPATIONAL THERAPY - DAILY TREATMENT NOTE (updated )    Patient Name: Emigdio Yanez    Date: 3/14/2023    : 1980  Insurance: Payor: Alejandra Ruiz / Plan: Alejandra Ruiz / Product Type: *No Product type* /      Patient  verified Yes     Visit #   Current / Total 8 12   Time   In / Out 1205 1255   Pain   In / Out 0/10 0/10   Subjective Functional Status/Changes: The shoulder feels really good. Has been doing some small chores around the house   Changes to:  Meds, Allergies, Med Hx, Sx Hx? If yes, update Summary List no       TREATMENT AREA =  Right shoulder pain [M25.511]    OBJECTIVE    Modalities Rationale:     decrease edema, decrease inflammation, decrease pain, and increase tissue extensibility to improve patient's ability to progress to PLOF and address remaining functional goals. min [] Estim Unattended, type/location:                                      []  w/ice    []  w/heat    min [] Estim Attended, type/location:                                     []  w/US     []  w/ice    []  w/heat    []  TENS insruct      min []  Mechanical Traction: type/lbs                   []  pro   []  sup   []  int   []  cont    []  before manual    []  after manual    min []  Ultrasound, settings/location:      min []  Iontophoresis w/ dexamethasone, location:                                               []  take home patch       []  in clinic   10 min  unbill [x]  Ice     []  Heat    location/position: Sit right shoulder    min []  Paraffin,  details:     min []  Vasopneumatic Device, press/temp:     min []  Eligio Nicolas / Ej Rue: If using vaso (only need to measure limb vaso being performed on)      pre-treatment girth :       post-treatment girth :       measured at (landmark location) :      min []  Other:    Skin assessment post-treatment (if applicable):    [x]  intact    []  redness- no adverse reaction                 []redness - adverse reaction:         Therapeutic Procedures:     Tx Min Billable or 1:1 Min (if diff from Tx Min) Procedure, Rationale, Specifics   15  .32074 Therapeutic Exercise (timed):  increase ROM, strength, coordination, balance, and proprioception to improve patient's ability to progress to PLOF and address remaining functional goals. (see flow sheet as applicable)        Details if applicable:       25  26432 Manual Therapy (timed):  decrease pain, increase ROM, increase tissue extensibility, and decrease edema to improve patient's ability to progress to PLOF and address remaining functional goals. The manual therapy interventions were performed at a separate and distinct time from the therapeutic activities interventions . (see flow sheet as applicable)     Details if applicable:  supine PROM R shoulder per tolerance all planes   GHJ mobs AP, inf gr 2-3   STM of pec major, lat, subscap          Details if applicable:            Details if applicable:            Details if applicable:     36  MC BC Totals Reminder: bill using total billable min of TIMED therapeutic procedures (example: do not include dry needle or estim unattended, both untimed codes, in totals to left)  8-22 min = 1 unit; 23-37 min = 2 units; 38-52 min = 3 units; 53-67 min = 4 units; 68-82 min = 5 units   Total Total     [x]  Patient Education billed concurrently with other procedures   [x] Review HEP    [] Progressed/Changed HEP, detail:    [] Other detail:       Objective Information/Functional Measures/Assessment    R shoulder PROM scaption 150 deg, IR 50 deg, ER 50 deg   Reviewed protocol to avoid lifting heavier than coffee cup or cell phone until 6 weeks. Can do light house stuff if following this protocol. Her ROM is progressing well at this point in rehab. Added light isometrics for ER/IR/ABD today. Tolerated well.      Patient will continue to benefit from skilled PT / OT services to modify and progress therapeutic interventions, analyze and address functional mobility deficits, analyze and address ROM deficits, analyze and address strength deficits, analyze and address soft tissue restrictions, analyze and cue for proper movement patterns, and instruct in home and community integration to address functional deficits and attain remaining goals.    Progress toward goals / Updated goals:  []  See Progress Note/Recertification    New Goals to be achieved in __8__ weeks  1. Pt will be IND with HEP to facilitate self care management.  2. Pt will improve R shoulder PROM to flexion/ABD >140 deg, Er >60 deg, IR >60 deg to order to improve ability to perform ADLs below shoulder height within protocol limits   Progressing 3/10/23 PROM scaption 140 deg, IR 50 deg, ER 25 improved to 45 deg   3. Pt will improve FOTO scores to 72 in order to show detectable change in overall function.     PLAN  Yes  Continue plan of care  [x]  Upgrade activities as tolerated  []  Discharge due to :  []  Other:    Nas Glez PT    3/14/2023    8:50 AM    Future Appointments   Date Time Provider Department Center   3/14/2023 12:00 PM Nas Glez PT [facility]   3/17/2023  2:30 PM [facility]   3/21/2023 11:30 AM Nas Glez PT [facility]   3/24/2023 11:30 AM Nas Glez PT [facility]   3/31/2023 11:30 AM Nas Glez PT [facility]

## 2023-03-17 ENCOUNTER — HOSPITAL ENCOUNTER (OUTPATIENT)
Facility: HOSPITAL | Age: 43
Setting detail: RECURRING SERIES
Discharge: HOME | End: 2023-03-20
Payer: COMMERCIAL

## 2023-03-17 PROCEDURE — 97140 MANUAL THERAPY 1/> REGIONS: CPT

## 2023-03-17 PROCEDURE — 97110 THERAPEUTIC EXERCISES: CPT

## 2023-03-17 NOTE — PROGRESS NOTES
Procedure, Rationale, Specifics   15  .98727 Therapeutic Exercise (timed):  increase ROM, strength, coordination, balance, and proprioception to improve patient's ability to progress to PLOF and address remaining functional goals. (see flow sheet as applicable)        Details if applicable:       25  44999 Manual Therapy (timed):  decrease pain, increase ROM, increase tissue extensibility, and decrease edema to improve patient's ability to progress to PLOF and address remaining functional goals. The manual therapy interventions were performed at a separate and distinct time from the therapeutic activities interventions . (see flow sheet as applicable)     Details if applicable:  supine PROM R shoulder per tolerance all planes   GHJ mobs AP, inf gr 2-3   STM of pec major, lat, subscap          Details if applicable:            Details if applicable:            Details if applicable:     36  MC BC Totals Reminder: bill using total billable min of TIMED therapeutic procedures (example: do not include dry needle or estim unattended, both untimed codes, in totals to left)  8-22 min = 1 unit; 23-37 min = 2 units; 38-52 min = 3 units; 53-67 min = 4 units; 68-82 min = 5 units   Total Total     [x]  Patient Education billed concurrently with other procedures   [x] Review HEP    [] Progressed/Changed HEP, detail:    [] Other detail:       Objective Information/Functional Measures/Assessment    Some cramping in the shoulder with walk away. Adjusted to have arms in more scaption position and this helped. Her ROM is progressing very well.  Intially very tight Er but improves post manual    AAROM progression to AROM at 6 weeks     Patient will continue to benefit from skilled PT / OT services to modify and progress therapeutic interventions, analyze and address functional mobility deficits, analyze and address ROM deficits, analyze and address strength deficits, analyze and address soft tissue restrictions, analyze and cue for

## 2023-03-21 ENCOUNTER — HOSPITAL ENCOUNTER (OUTPATIENT)
Facility: HOSPITAL | Age: 43
Setting detail: RECURRING SERIES
Discharge: HOME | End: 2023-03-24
Payer: COMMERCIAL

## 2023-03-21 PROCEDURE — 97140 MANUAL THERAPY 1/> REGIONS: CPT

## 2023-03-21 PROCEDURE — 97110 THERAPEUTIC EXERCISES: CPT

## 2023-03-21 NOTE — PROGRESS NOTES
to modify and progress therapeutic interventions, analyze and address functional mobility deficits, analyze and address ROM deficits, analyze and address strength deficits, analyze and address soft tissue restrictions, analyze and cue for proper movement patterns, and instruct in home and community integration to address functional deficits and attain remaining goals. Progress toward goals / Updated goals:  []  See Progress Note/Recertification    New Goals to be achieved in __8__ weeks  1. Pt will be IND with HEP to facilitate self care management. MET 3/2123  2. Pt will improve R shoulder PROM to flexion/ABD >140 deg, Er >60 deg, IR >60 deg to order to improve ability to perform ADLs below shoulder height within protocol limits   Progressing 3/17/23 PROM scaption 140 deg, IR 50 deg, ER 50 deg  3. Pt will improve FOTO scores to 72 in order to show detectable change in overall function.      PLAN  Yes  Continue plan of care  [x]  Upgrade activities as tolerated  []  Discharge due to :  []  Other:    Mahi Chacon, PT    3/21/2023    8:58 AM    Future Appointments   Date Time Provider Katelyn Perera   3/21/2023 11:30 AM Mahi Chacon PT [facility]   3/24/2023 11:30 AM Mahi Chacon, PT [facility]   3/31/2023 11:30 AM Mahi Chacon PT [facility]   4/3/2023  2:30 PM Mahi Chacon PT [facility]   4/7/2023  2:30 PM Mahi Chacon PT [facility]   4/11/2023 10:30 AM Mahi Chacon PT [facility]   4/14/2023  3:30 PM Mahi Chacon PT [facility]

## 2023-03-24 ENCOUNTER — HOSPITAL ENCOUNTER (OUTPATIENT)
Facility: HOSPITAL | Age: 43
Setting detail: RECURRING SERIES
Discharge: HOME | End: 2023-03-27
Payer: COMMERCIAL

## 2023-03-24 PROCEDURE — 97140 MANUAL THERAPY 1/> REGIONS: CPT

## 2023-03-24 PROCEDURE — 97110 THERAPEUTIC EXERCISES: CPT

## 2023-03-24 NOTE — PROGRESS NOTES
7 x weekly - 10 reps - 5\" hold  - Standing Shoulder Abduction Finger Walk at Wall  - 1-4 x daily - 7 x weekly - 10 reps - 5\" hold  - Seated Shoulder Abduction Towel Slide at Table Top with Forearm in Neutral  - 1-4 x daily - 7 x weekly - 10 reps - 5\" hold  - Supine Cross Body Shoulder Stretch  - 1-4 x daily - 7 x weekly - 10 sets - 5\" hold  - Standing Shoulder Flexion to 90 Degrees  - 1-2 x daily - 7 x weekly - 3 sets - 10 reps  - Shoulder Abduction - Thumbs Up  - 1-2 x daily - 7 x weekly - 3 sets - 10 reps  - Standing Bicep Curls Supinated with Dumbbells  - 1 x daily - 4 x weekly - 3 sets - 10 reps  - Sidelying Shoulder ER with Towel and Dumbbell  - 1 x daily - 4 x weekly - 3 sets - 10 reps  - Standing Shoulder Row with Anchored Resistance  - 1 x daily - 4 x weekly - 3 sets - 10 reps  - Shoulder External Rotation with Anchored Resistance  - 1 x daily - 4 x weekly - 3 sets - 10 reps  - Shoulder Internal Rotation with Resistance  - 1 x daily - 4 x weekly - 3 sets - 10 reps    Start at week 6     [] Other detail:       Objective Information/Functional Measures/Assessment    Did very well with her PROM today. True ABD was at about 140 deg and scaption to about 160 deg. Er improved to 70 deg in slightly more ABD position today, better than when ABD to 30 deg. Reviewed her updated HEP in depth instructing her to initiate these in the middle of next week when she is 6 weeks post op.  Instrutced to hold off on: S/L Er with DB, row with TB, Er/IR with TB until she returns to PT.     AAROM progression to AROM at 6 weeks   Progress note next visit       Patient will continue to benefit from skilled PT / OT services to modify and progress therapeutic interventions, analyze and address functional mobility deficits, analyze and address ROM deficits, analyze and address strength deficits, analyze and address soft tissue restrictions, analyze and cue for proper movement patterns, and instruct in home and community integration to

## 2023-03-31 ENCOUNTER — APPOINTMENT (OUTPATIENT)
Facility: HOSPITAL | Age: 43
End: 2023-03-31
Payer: COMMERCIAL

## 2023-04-03 ENCOUNTER — HOSPITAL ENCOUNTER (OUTPATIENT)
Facility: HOSPITAL | Age: 43
Setting detail: RECURRING SERIES
Discharge: HOME | End: 2023-04-06
Payer: COMMERCIAL

## 2023-04-03 PROCEDURE — 97535 SELF CARE MNGMENT TRAINING: CPT

## 2023-04-03 PROCEDURE — 97110 THERAPEUTIC EXERCISES: CPT

## 2023-04-03 PROCEDURE — 97140 MANUAL THERAPY 1/> REGIONS: CPT

## 2023-04-03 NOTE — PROGRESS NOTES
overall pain levels despite increases in activity. 2) Pt will improve FOTO scores to 72 in order to show detectable change in overall function. 3) Pt will improve shoulder MMT to >/= 4+/5 in order to be able to perform all ADLs with adequate ability and strength    4) Pt will improve R shoulder AROM to flexion/ABD >160 deg, KAREN >C7 and FIR >L2 to order to improve ability to perform all ADLs without restriction.        PLAN  Yes  Continue plan of care  [x]  Upgrade activities as tolerated  []  Discharge due to :  []  Other:    Miguel Ritchie, PT    4/3/2023    8:26 AM    Future Appointments   Date Time Provider Katelyn Perera   4/3/2023  2:30 PM Miguel Ritchie, PT [facility]   4/7/2023  2:30 PM Miguel Ritchie, PT [facility]   4/11/2023  4:30 PM Miguel Ritchie, PT [facility]   4/14/2023  3:30 PM Miguel Ritchie, PT [facility]

## 2023-04-03 NOTE — THERAPY RECERTIFICATION
[address] [place] [address] PHYSICAL THERAPY  [address] [facility], [address], [place], [address] [place] [address] Ph: [phone] Fx: [phone]  PHYSICAL THERAPY PROGRESS NOTE  Patient Name: Nerissa Beckford : 1980   Treatment/Medical Diagnosis: Right shoulder pain [M25.511]   Referral Source: Olivia Spencer MD     Date of Initial Visit: 23 Attended Visits: 12 Missed Visits: 0     SUMMARY OF TREATMENT  Pt has been evaluated/assessed and participated in skilled therapeutic exercise, neuromuscular facilitation and coordination training, patient education,?manual therapy interventions including joint mobs gr 2-4, DTM/STM, shoulder PROM and stretching, modalities including ice?and instruction on HEP in order to improve upon R shoulder ROM, strength, decreased pain, and return to PLOF as per protocol    SUBJECTIVE: trip went well. Did a little more lifting than she would have liked with suitcases, but tried to avoid using the R arm for lifting the best that she could. Pt has not been in PT of about 1.5 weeks. No longer using the sling due to being 6 weeks. Tried the new exercises and went well. CURRENT STATUS  Pt is s/p R shoulder arthroscopic biceps tenodesis, debridement and SAD performed on 2/15/23. Pt is currently about 6 weeks post op. Pt has made  progress in PT as demonstrated by decreased max pain levels at 0/10 and average pain level of 0/10. Pt reports mostly soreness. Pt also notes sleeping has gotten better but has to jennifer a pillow so it does not get too tight when laying on her L side. Started the new exercises for progression to AAROM and AROM and notes mostly tightness and fatigue. Pt will continue to benefit from skilled PT to progress exercises and improve upon?functional activities.     OBJECTIVE:   R shoulder PROM  Flexion 130 deg improved  to 150 deg    deg improved to 135 deg   ER 42 deg improved to 55 deg   IR 50 deg improved to 60 deg     R shoulder AROm   Flexion

## 2023-04-14 ENCOUNTER — HOSPITAL ENCOUNTER (OUTPATIENT)
Facility: HOSPITAL | Age: 43
Setting detail: RECURRING SERIES
Discharge: HOME | End: 2023-04-17
Payer: COMMERCIAL

## 2023-04-14 PROCEDURE — 97140 MANUAL THERAPY 1/> REGIONS: CPT

## 2023-04-14 PROCEDURE — 97110 THERAPEUTIC EXERCISES: CPT

## 2023-04-14 PROCEDURE — 97112 NEUROMUSCULAR REEDUCATION: CPT

## 2023-04-18 ENCOUNTER — HOSPITAL ENCOUNTER (OUTPATIENT)
Facility: HOSPITAL | Age: 43
Setting detail: RECURRING SERIES
Discharge: HOME | End: 2023-04-21
Payer: COMMERCIAL

## 2023-04-18 PROCEDURE — 97140 MANUAL THERAPY 1/> REGIONS: CPT

## 2023-04-18 PROCEDURE — 97110 THERAPEUTIC EXERCISES: CPT

## 2023-04-18 PROCEDURE — 97112 NEUROMUSCULAR REEDUCATION: CPT

## 2023-04-18 NOTE — PROGRESS NOTES
deficits, analyze and address strength deficits, analyze and address soft tissue restrictions, analyze and cue for proper movement patterns, and instruct in home and community integration to address functional deficits and attain remaining goals. Progress toward goals / Updated goals:  [x]  See Progress Note/Recertification    New Goals to be achieved in __6__ weeks  1) Pt will maintain an average pain of 3/10 or less with all activities showing a progression in overall pain levels despite increases in activity. MET 4/14/23 0/10  2) Pt will improve FOTO scores to 72 in order to show detectable change in overall function. 3) Pt will improve shoulder MMT to >/= 4+/5 in order to be able to perform all ADLs with adequate ability and strength    4) Pt will improve R shoulder AROM to flexion/ABD >160 deg, KAREN >C7 and FIR >L2 to order to improve ability to perform all ADLs without restriction.    good progress 4/18/23    PLAN  Yes  Continue plan of care  [x]  Upgrade activities as tolerated  []  Discharge due to :  []  Other:    Charlotte Thornton PT    4/18/2023    9:35 AM    Future Appointments   Date Time Provider Katelyn Perera   4/18/2023  5:40 PM Charlotte Thornton PT [facility]   4/21/2023  2:20 PM Charlotte Thornton PT [facility]   4/25/2023  5:40 PM Charlotte Thornton PT [facility]   4/28/2023  1:40 PM Charlotte Thornton PT [facility]

## 2023-04-21 ENCOUNTER — HOSPITAL ENCOUNTER (OUTPATIENT)
Facility: HOSPITAL | Age: 43
Setting detail: RECURRING SERIES
Discharge: HOME | End: 2023-04-24
Payer: COMMERCIAL

## 2023-04-21 PROCEDURE — 97112 NEUROMUSCULAR REEDUCATION: CPT

## 2023-04-21 PROCEDURE — 97110 THERAPEUTIC EXERCISES: CPT

## 2023-04-21 PROCEDURE — 97140 MANUAL THERAPY 1/> REGIONS: CPT

## 2023-04-25 ENCOUNTER — HOSPITAL ENCOUNTER (OUTPATIENT)
Facility: HOSPITAL | Age: 43
Setting detail: RECURRING SERIES
Discharge: HOME | End: 2023-04-28
Payer: COMMERCIAL

## 2023-04-25 PROCEDURE — 97140 MANUAL THERAPY 1/> REGIONS: CPT

## 2023-04-25 PROCEDURE — 97110 THERAPEUTIC EXERCISES: CPT

## 2023-04-25 NOTE — PROGRESS NOTES
soft tissue restrictions, analyze and cue for proper movement patterns, and instruct in home and community integration to address functional deficits and attain remaining goals. Progress toward goals / Updated goals:  [x]  See Progress Note/Recertification    New Goals to be achieved in __6__ weeks  1) Pt will maintain an average pain of 3/10 or less with all activities showing a progression in overall pain levels despite increases in activity. MET 4/14/23 0/10  2) Pt will improve FOTO scores to 72 in order to show detectable change in overall function. 3) Pt will improve shoulder MMT to >/= 4+/5 in order to be able to perform all ADLs with adequate ability and strength    4) Pt will improve R shoulder AROM to flexion/ABD >160 deg, KAREN >C7 and FIR >L2 to order to improve ability to perform all ADLs without restriction.    ABD AROm 145 deg  4/21/23    PLAN  Yes  Continue plan of care  [x]  Upgrade activities as tolerated  []  Discharge due to :  []  Other:    Sohan Lamar PT    4/25/2023    5:54 PM    Future Appointments   Date Time Provider Katelyn Perera   4/28/2023  1:40 PM Sohan Lamar PT [facility]

## 2023-04-28 ENCOUNTER — HOSPITAL ENCOUNTER (OUTPATIENT)
Facility: HOSPITAL | Age: 43
Setting detail: RECURRING SERIES
Discharge: HOME | End: 2023-05-01
Payer: COMMERCIAL

## 2023-04-28 PROCEDURE — 97140 MANUAL THERAPY 1/> REGIONS: CPT

## 2023-04-28 PROCEDURE — 97110 THERAPEUTIC EXERCISES: CPT

## 2023-05-02 ENCOUNTER — HOSPITAL ENCOUNTER (OUTPATIENT)
Facility: HOSPITAL | Age: 43
Setting detail: RECURRING SERIES
Discharge: HOME | End: 2023-05-05
Payer: COMMERCIAL

## 2023-05-02 PROCEDURE — 97110 THERAPEUTIC EXERCISES: CPT

## 2023-05-02 PROCEDURE — 97140 MANUAL THERAPY 1/> REGIONS: CPT

## 2023-05-02 NOTE — PROGRESS NOTES
PHYSICAL / OCCUPATIONAL THERAPY - DAILY TREATMENT NOTE (updated )    Patient Name: Harper Dudley    Date: 2023    : 1980  Insurance: Payor: Dallas Hassan / Plan: Dallas Hassan NAP CHOICE POS II / Product Type: *No Product type* /      Patient  verified Yes     Visit #   Current / Total 20 24   Time   In / Out 455 550   Pain   In / Out 0/10 0/10   Subjective Functional Status/Changes: Feels better today. Not as tired today. Changes to:  Meds, Allergies, Med Hx, Sx Hx? If yes, update Summary List no       TREATMENT AREA =  Right shoulder pain [M25.511]    OBJECTIVE    Modalities Rationale:     decrease edema, decrease inflammation, decrease pain, and increase tissue extensibility to improve patient's ability to progress to PLOF and address remaining functional goals. min [] Estim Unattended, type/location:                                      []  w/ice    []  w/heat    min [] Estim Attended, type/location:                                     []  w/US     []  w/ice    []  w/heat    []  TENS insruct      min []  Mechanical Traction: type/lbs                   []  pro   []  sup   []  int   []  cont    []  before manual    []  after manual    min []  Ultrasound, settings/location:      min []  Iontophoresis w/ dexamethasone, location:                                               []  take home patch       []  in clinic   PD min  unbill [x]  Ice     []  Heat    location/position: Sit right shoulder    min []  Paraffin,  details:     min []  Vasopneumatic Device, press/temp:     min []  Fleurette Bullion / [place] Manual: If using vaso (only need to measure limb vaso being performed on)      pre-treatment girth :       post-treatment girth :       measured at (landmark location) :      min []  Other:    Skin assessment post-treatment (if applicable):    [x]  intact    []  redness- no adverse reaction                 []redness - adverse reaction:         Therapeutic Procedures:     Tx Min Billable or 1:1 Min (if diff from Tx

## 2023-05-05 ENCOUNTER — HOSPITAL ENCOUNTER (OUTPATIENT)
Facility: HOSPITAL | Age: 43
Setting detail: RECURRING SERIES
Discharge: HOME | End: 2023-05-08
Payer: COMMERCIAL

## 2023-05-05 PROCEDURE — 97140 MANUAL THERAPY 1/> REGIONS: CPT

## 2023-05-05 PROCEDURE — 97110 THERAPEUTIC EXERCISES: CPT

## 2023-05-09 ENCOUNTER — HOSPITAL ENCOUNTER (OUTPATIENT)
Facility: HOSPITAL | Age: 43
Setting detail: RECURRING SERIES
Discharge: HOME | End: 2023-05-12
Payer: COMMERCIAL

## 2023-05-09 PROCEDURE — 97140 MANUAL THERAPY 1/> REGIONS: CPT

## 2023-05-09 PROCEDURE — 97110 THERAPEUTIC EXERCISES: CPT

## 2023-05-09 NOTE — PROGRESS NOTES
or 1:1 Min (if diff from Tx Min) Procedure, Rationale, Specifics   15  [id number] Therapeutic Exercise (timed):  increase ROM, strength, coordination, balance, and proprioception to improve patient's ability to progress to PLOF and address remaining functional goals. (see flow sheet as applicable)        Details if applicable:       15  62619 Manual Therapy (timed):  decrease pain, increase ROM, increase tissue extensibility, and decrease edema to improve patient's ability to progress to PLOF and address remaining functional goals. The manual therapy interventions were performed at a separate and distinct time from the therapeutic activities interventions . (see flow sheet as applicable)     Details if applicable:    R shoulder stretching IR  S/L shoulder extension stretching with pin of scap  GH joint mobs AP and inf gr 4  DTM of posterior cuff in L S/L (ND)  Pin of scap with OH stretching         70520 Neuromuscular Re-Education (timed):  improve balance, coordination, kinesthetic sense, posture, core stability and proprioception to improve patient's ability to develop conscious control of individual muscles and awareness of position of extremities in order to progress to PLOF and address remaining functional goals. (see flow sheet as applicable)     Details if applicable:            Details if applicable:            Details if applicable:     27  [facility] Totals Reminder: bill using total billable min of TIMED therapeutic procedures (example: do not include dry needle or estim unattended, both untimed codes, in totals to left)  8-22 min = 1 unit; 23-37 min = 2 units; 38-52 min = 3 units; 53-67 min = 4 units; 68-82 min = 5 units   Total Total     [x]  Patient Education billed concurrently with other procedures   [x] Review HEP    [] Progressed/Changed HEP, detail:  Access Code: [email or web address]  URL: [email or web address]  Date: 03/24/2023  Prepared by: Marta Alves    Exercises  - Seated Scapular

## 2023-05-12 ENCOUNTER — HOSPITAL ENCOUNTER (OUTPATIENT)
Facility: HOSPITAL | Age: 43
Setting detail: RECURRING SERIES
Discharge: HOME | End: 2023-05-15
Payer: COMMERCIAL

## 2023-05-12 PROCEDURE — 97110 THERAPEUTIC EXERCISES: CPT

## 2023-05-12 PROCEDURE — 97140 MANUAL THERAPY 1/> REGIONS: CPT

## 2023-05-16 ENCOUNTER — HOSPITAL ENCOUNTER (OUTPATIENT)
Facility: HOSPITAL | Age: 43
Setting detail: RECURRING SERIES
Discharge: HOME | End: 2023-05-19
Payer: COMMERCIAL

## 2023-05-16 PROCEDURE — 97110 THERAPEUTIC EXERCISES: CPT

## 2023-05-16 PROCEDURE — 97140 MANUAL THERAPY 1/> REGIONS: CPT

## 2023-05-16 NOTE — PROGRESS NOTES
PHYSICAL / OCCUPATIONAL THERAPY - DAILY TREATMENT NOTE (updated )    Patient Name: Lucina Buck    Date: 2023    : 1980  Insurance: Payor: Marylu Hester / Plan: Marylu Hester NAP CHOICE POS II / Product Type: *No Product type* /      Patient  verified Yes     Visit #   Current / Total 24 29   Time   In / Out 345 435   Pain   In / Out 0/10 0/10   Subjective Functional Status/Changes: The shoulder is doing well. Changes to:  Meds, Allergies, Med Hx, Sx Hx? If yes, update Summary List no       TREATMENT AREA =  Right shoulder pain [M25.511]    OBJECTIVE    Modalities Rationale:     decrease edema, decrease inflammation, decrease pain, and increase tissue extensibility to improve patient's ability to progress to PLOF and address remaining functional goals. min [] Estim Unattended, type/location:                                      []  w/ice    []  w/heat    min [] Estim Attended, type/location:                                     []  w/US     []  w/ice    []  w/heat    []  TENS insruct      min []  Mechanical Traction: type/lbs                   []  pro   []  sup   []  int   []  cont    []  before manual    []  after manual    min []  Ultrasound, settings/location:      min []  Iontophoresis w/ dexamethasone, location:                                               []  take home patch       []  in clinic   PD min  unbill [x]  Ice     []  Heat    location/position: Sit right shoulder    min []  Paraffin,  details:     min []  Vasopneumatic Device, press/temp:     min []  Sargent Sargent / Disha Bless: If using vaso (only need to measure limb vaso being performed on)      pre-treatment girth :       post-treatment girth :       measured at (landmark location) :      min []  Other:    Skin assessment post-treatment (if applicable):    []  intact    []  redness- no adverse reaction                 []redness - adverse reaction:         Therapeutic Procedures:     Tx Min Billable or 1:1 Min (if diff from [employer])

## 2023-05-19 ENCOUNTER — APPOINTMENT (OUTPATIENT)
Facility: HOSPITAL | Age: 43
End: 2023-05-19
Payer: COMMERCIAL